# Patient Record
Sex: MALE | Race: WHITE | URBAN - METROPOLITAN AREA
[De-identification: names, ages, dates, MRNs, and addresses within clinical notes are randomized per-mention and may not be internally consistent; named-entity substitution may affect disease eponyms.]

---

## 2017-06-01 ENCOUNTER — IMPORTED ENCOUNTER (OUTPATIENT)
Dept: URBAN - METROPOLITAN AREA CLINIC 50 | Facility: CLINIC | Age: 72
End: 2017-06-01

## 2017-06-28 ENCOUNTER — IMPORTED ENCOUNTER (OUTPATIENT)
Dept: URBAN - METROPOLITAN AREA CLINIC 50 | Facility: CLINIC | Age: 72
End: 2017-06-28

## 2017-09-26 ENCOUNTER — IMPORTED ENCOUNTER (OUTPATIENT)
Dept: URBAN - METROPOLITAN AREA CLINIC 50 | Facility: CLINIC | Age: 72
End: 2017-09-26

## 2017-12-13 ENCOUNTER — IMPORTED ENCOUNTER (OUTPATIENT)
Dept: URBAN - METROPOLITAN AREA CLINIC 50 | Facility: CLINIC | Age: 72
End: 2017-12-13

## 2017-12-14 ENCOUNTER — IMPORTED ENCOUNTER (OUTPATIENT)
Dept: URBAN - METROPOLITAN AREA CLINIC 50 | Facility: CLINIC | Age: 72
End: 2017-12-14

## 2018-02-12 ENCOUNTER — IMPORTED ENCOUNTER (OUTPATIENT)
Dept: URBAN - METROPOLITAN AREA CLINIC 50 | Facility: CLINIC | Age: 73
End: 2018-02-12

## 2018-05-11 ENCOUNTER — IMPORTED ENCOUNTER (OUTPATIENT)
Dept: URBAN - METROPOLITAN AREA CLINIC 50 | Facility: CLINIC | Age: 73
End: 2018-05-11

## 2018-09-18 ENCOUNTER — IMPORTED ENCOUNTER (OUTPATIENT)
Dept: URBAN - METROPOLITAN AREA CLINIC 50 | Facility: CLINIC | Age: 73
End: 2018-09-18

## 2018-10-05 ENCOUNTER — IMPORTED ENCOUNTER (OUTPATIENT)
Dept: URBAN - METROPOLITAN AREA CLINIC 50 | Facility: CLINIC | Age: 73
End: 2018-10-05

## 2019-02-28 ENCOUNTER — IMPORTED ENCOUNTER (OUTPATIENT)
Dept: URBAN - METROPOLITAN AREA CLINIC 50 | Facility: CLINIC | Age: 74
End: 2019-02-28

## 2019-07-26 ENCOUNTER — IMPORTED ENCOUNTER (OUTPATIENT)
Dept: URBAN - METROPOLITAN AREA CLINIC 50 | Facility: CLINIC | Age: 74
End: 2019-07-26

## 2019-07-26 NOTE — PATIENT DISCUSSION
"""IOP elevated today likely due to patient running out of Latanoprost and Azopt.  Instructed patient ""

## 2019-07-29 ENCOUNTER — IMPORTED ENCOUNTER (OUTPATIENT)
Dept: URBAN - METROPOLITAN AREA CLINIC 50 | Facility: CLINIC | Age: 74
End: 2019-07-29

## 2021-04-18 ASSESSMENT — TONOMETRY
OD_IOP_MMHG: 13
OD_IOP_MMHG: 14
OS_IOP_MMHG: 16
OD_IOP_MMHG: 10
OD_IOP_MMHG: 16
OS_IOP_MMHG: 12
OD_IOP_MMHG: 10
OD_IOP_MMHG: 13
OD_IOP_MMHG: 19
OS_IOP_MMHG: 17
OD_IOP_MMHG: 17
OS_IOP_MMHG: 19

## 2021-04-18 ASSESSMENT — VISUAL ACUITY
OD_CC: J1+
OD_CC: 20/20-
OS_CC: J1@ 15 IN
OS_CC: J1+
OD_CC: J1@ 15 IN
OD_CC: 20/25
OD_CC: 20/20
OD_CC: 20/30+2

## 2021-04-18 ASSESSMENT — PACHYMETRY
OS_CT_UM: 548
OS_CT_UM: 548
OD_CT_UM: 484
OS_CT_UM: 548
OS_CT_UM: 548
OD_CT_UM: 484
OD_CT_UM: 484
OS_CT_UM: 548
OS_CT_UM: 548
OD_CT_UM: 484
OS_CT_UM: 548
OD_CT_UM: 484
OS_CT_UM: 548
OD_CT_UM: 484
OD_CT_UM: 484
OS_CT_UM: 548
OD_CT_UM: 484
OD_CT_UM: 484
OS_CT_UM: 548
OD_CT_UM: 484

## 2022-09-02 NOTE — PATIENT DISCUSSION
VM - pended 2 new orders, if appropriate - ty    [phone] spoke to pt, he was told by Romel that if the medications were sent in as two separate creams, his insurance would pay. Informed pt I would call Romel to confirm this. [phone] spoke to Pharmacist he checked the medications separately through pt's insurance:    Clotrimazole 1% - 30 g tube is covered    For the steroid portion, either Triamcinolone 0.1% or  Hydrocortisone 2.5% would be covered. Pharmacist cancelled prescription sent yesterday, informed him with check with VM and send new Rx for two separate creams. updated CL Rx today.